# Patient Record
Sex: MALE | Race: ASIAN | URBAN - METROPOLITAN AREA
[De-identification: names, ages, dates, MRNs, and addresses within clinical notes are randomized per-mention and may not be internally consistent; named-entity substitution may affect disease eponyms.]

---

## 2023-09-01 ENCOUNTER — OFFICE VISIT (OUTPATIENT)
Dept: OTOLARYNGOLOGY | Facility: CLINIC | Age: 23
End: 2023-09-01

## 2023-09-01 DIAGNOSIS — H60.391 OTHER INFECTIVE CHRONIC OTITIS EXTERNA OF RIGHT EAR: Primary | ICD-10-CM

## 2023-09-01 PROCEDURE — 92504 EAR MICROSCOPY EXAMINATION: CPT | Performed by: OTOLARYNGOLOGY

## 2023-09-01 PROCEDURE — 99203 OFFICE O/P NEW LOW 30 MIN: CPT | Performed by: OTOLARYNGOLOGY

## 2023-09-01 RX ORDER — AMOXICILLIN 875 MG/1
TABLET, COATED ORAL
COMMUNITY
Start: 2023-08-29

## 2023-09-01 RX ORDER — NEOMYCIN SULFATE, POLYMYXIN B SULFATE AND HYDROCORTISONE 10; 3.5; 1 MG/ML; MG/ML; [USP'U]/ML
4 SUSPENSION/ DROPS AURICULAR (OTIC) 4 TIMES DAILY
Qty: 1 EACH | Refills: 0 | Status: SHIPPED | OUTPATIENT
Start: 2023-09-01

## 2023-09-01 RX ORDER — METHYLPREDNISOLONE 4 MG/1
TABLET ORAL
COMMUNITY
Start: 2023-08-29

## 2023-09-01 RX ORDER — CIPROFLOXACIN 500 MG/1
500 TABLET, FILM COATED ORAL EVERY 12 HOURS
Qty: 14 TABLET | Refills: 0 | Status: SHIPPED | OUTPATIENT
Start: 2023-09-01

## 2023-09-01 RX ORDER — METRONIDAZOLE 500 MG/1
TABLET ORAL
COMMUNITY
Start: 2023-06-15

## 2023-09-01 RX ORDER — CIPROFLOXACIN 500 MG/1
500 TABLET, FILM COATED ORAL 2 TIMES DAILY
COMMUNITY
Start: 2023-06-15

## 2023-09-05 ENCOUNTER — OFFICE VISIT (OUTPATIENT)
Dept: OTOLARYNGOLOGY | Facility: CLINIC | Age: 23
End: 2023-09-05
Payer: COMMERCIAL

## 2023-09-05 VITALS — TEMPERATURE: 98 F

## 2023-09-05 DIAGNOSIS — H60.391 OTHER INFECTIVE CHRONIC OTITIS EXTERNA OF RIGHT EAR: Primary | ICD-10-CM

## 2023-09-05 PROCEDURE — 99213 OFFICE O/P EST LOW 20 MIN: CPT | Performed by: OTOLARYNGOLOGY

## 2023-09-05 RX ORDER — NEOMYCIN SULFATE, POLYMYXIN B SULFATE AND HYDROCORTISONE 10; 3.5; 1 MG/ML; MG/ML; [USP'U]/ML
4 SUSPENSION/ DROPS AURICULAR (OTIC) 4 TIMES DAILY
Qty: 1 EACH | Refills: 0 | Status: SHIPPED | OUTPATIENT
Start: 2023-09-05

## 2023-09-12 ENCOUNTER — OFFICE VISIT (OUTPATIENT)
Dept: OTOLARYNGOLOGY | Facility: CLINIC | Age: 23
End: 2023-09-12

## 2023-09-12 VITALS — TEMPERATURE: 98 F

## 2023-09-12 DIAGNOSIS — H61.21 IMPACTED CERUMEN OF RIGHT EAR: ICD-10-CM

## 2023-09-12 DIAGNOSIS — H71.91 CHOLESTEATOMA OF RIGHT EAR: Primary | ICD-10-CM

## 2023-09-12 PROCEDURE — 99213 OFFICE O/P EST LOW 20 MIN: CPT | Performed by: OTOLARYNGOLOGY

## 2023-09-12 PROCEDURE — 69210 REMOVE IMPACTED EAR WAX UNI: CPT | Performed by: OTOLARYNGOLOGY

## 2023-09-13 ENCOUNTER — TELEPHONE (OUTPATIENT)
Dept: OTOLARYNGOLOGY | Facility: CLINIC | Age: 23
End: 2023-09-13

## 2023-09-13 DIAGNOSIS — H71.91 CHOLESTEATOMA OF RIGHT EAR: Primary | ICD-10-CM

## 2023-09-21 ENCOUNTER — TELEPHONE (OUTPATIENT)
Dept: OTOLARYNGOLOGY | Facility: CLINIC | Age: 23
End: 2023-09-21

## 2023-09-21 NOTE — TELEPHONE ENCOUNTER
Per Dr. Candace Padilla instructions patient to bring a CD scan to his visit. Patient scheduled an appointment to see Dr. Candace Padilla , will bring a disc.

## 2023-09-28 ENCOUNTER — OFFICE VISIT (OUTPATIENT)
Dept: OTOLARYNGOLOGY | Facility: CLINIC | Age: 23
End: 2023-09-28

## 2023-09-28 DIAGNOSIS — H71.91 CHOLESTEATOMA OF RIGHT EAR: Primary | ICD-10-CM

## 2023-09-28 PROCEDURE — 99213 OFFICE O/P EST LOW 20 MIN: CPT | Performed by: OTOLARYNGOLOGY

## 2023-09-28 RX ORDER — NEOMYCIN SULFATE, POLYMYXIN B SULFATE AND HYDROCORTISONE 10; 3.5; 1 MG/ML; MG/ML; [USP'U]/ML
4 SUSPENSION/ DROPS AURICULAR (OTIC) 4 TIMES DAILY
Qty: 1 EACH | Refills: 0 | Status: SHIPPED | OUTPATIENT
Start: 2023-09-28

## 2023-09-28 NOTE — PROGRESS NOTES
Boby Mohan is a 21year old male. Patient presents with: Follow - Up: F/up cholesteatoma of right ear  Pt had a CT but images on disc aren't accessible  Pt is calling to get images faxed [facility]        [facility]  He presents with 5-day history of right-sided ear pain. Prescribed amoxicillin in urgent care and the [facility] clinic. Seen twice and has not had any improvement in his symptoms. Very severe ear discomfort. 9/5/23 he has been using Cortisporin drops since I last saw him 4 days ago. Feels that things are slightly less painful and irritated. Had been on antibiotics in the past.  Noted to have severe wax impaction with associated otitis externa     9/12/23 has been using drops last 2 weeks. Now with some right ear pain. Last visit appeared to have cholesteatoma. 9/28/23 here to review his CT scan. Results as read by the radiologist revealed no abnormalities of the right or left temporal bone or associated ear structures. .  She noted to have significant amounts of cholesteatomatous looking material in the ear canal with what appeared to be some changes to the bony canal compared to the left side. Unfortunately we could not open up the images to a CT scan for my viewing. Feels that his hearing is much improved here for routine cleaning and reevaluation      Social History    Socioeconomic History      Marital status: Single      History reviewed. No pertinent family history. History reviewed. No pertinent past medical history. History reviewed. No pertinent surgical history. REVIEW OF SYSTEMS    System Neg/Pos Details   Constitutional Negative Fatigue, fever and weight loss. ENMT Negative Drooling. Eyes Negative Blurred vision and vision changes. Respiratory Negative Dyspnea and wheezing. Cardio Negative Chest pain, irregular heartbeat/palpitations and syncope. GI Negative Abdominal pain and diarrhea.    Endocrine Negative Cold intolerance and heat intolerance. Neuro Negative Tremors. Psych Negative Anxiety and depression. Integumentary Negative Frequent skin infections, pigment change and rash. Hema/Lymph Negative Easy bleeding and easy bruising. PHYSICAL EXAM    There were no vitals taken for this visit. Constitutional Normal Overall appearance - Normal.   Psychiatric Normal Orientation - Oriented to time, place, person & situation. Appropriate mood and affect. Neck Exam Normal Inspection - Normal. Palpation - Normal. Parotid gland - Normal. Thyroid gland - Normal.   Eyes Normal Conjunctiva - Right: Normal, Left: Normal. Pupil - Right: Normal, Left: Normal. Fundus - Right: Normal, Left: Normal.   Neurological Normal Memory - Normal. Cranial nerves - Cranial nerves II through XII grossly intact. Head/Face Normal Facial features - Normal. Eyebrows - Normal. Skull - Normal.        Nasopharynx Normal External nose - Normal. Lips/teeth/gums - Normal. Tonsils - Normal. Oropharynx - Normal.   Ears Normal Inspection - Right: Normal, Left: Normal. Canal - Right: Seems to have had some bony recession in certain areas. Left: Normal. TM - Right: Normal, Left: Normal.   Skin Normal Inspection - Normal.        Lymph Detail Normal Submental. Submandibular. Anterior cervical. Posterior cervical. Supraclavicular. Nose/Mouth/Throat Normal External nose - Normal. Lips/teeth/gums - Normal. Tonsils - Normal. Oropharynx - Normal.   Nose/Mouth/Throat Normal Nares - Right: Normal Left: Normal. Septum -Normal  Turbinates - Right: Normal, Left: Normal.       Current Outpatient Medications:     neomycin-polymyxin-hydrocortisone 3.5-84457-9 Otic Suspension, Place 4 drops into the right ear 4 (four) times daily. , Disp: 1 each, Rfl: 0    amoxicillin 875 MG Oral Tab, TAKE 1 TABLET BY MOUTH TWICE A DAY FOR 10 DAYS *NEED INSURANCE INFO*, Disp: , Rfl:     ciprofloxacin 500 MG Oral Tab, Take 1 tablet (500 mg total) by mouth 2 (two) times daily. , Disp: , Rfl:     methylPREDNISolone 4 MG Oral Tablet Therapy Pack, TAKE 6 TABLETS ON DAY 1 AS DIRECTED ON PACKAGE AND DECREASE BY 1 TAB EACH DAY FOR A TOTAL OF 6 DAYS (Patient not taking: Reported on 9/12/2023), Disp: , Rfl:     metRONIDAZOLE 500 MG Oral Tab, TAKE 1 TABLET BY MOUTH EVERY 8 HOURS FOR 5 DAYS, Disp: , Rfl:     ciprofloxacin 500 MG Oral Tab, Take 1 tablet (500 mg total) by mouth every 12 (twelve) hours. , Disp: 14 tablet, Rfl: 0  ASSESSMENT AND PLAN    1. Cholesteatoma of right ear  Some debris removed from the right ear canal.  Some changes to the canal bone but the eardrum is intact. Some areas of continued bleeding so I have asked him to continue with his eardrops for now. Return to see me in 2 weeks and he will try to get another copy of the CT scan so we can review the images. This note was prepared using [facility] [facility] [employer] voice recognition dictation software. As a result errors may occur. When identified these errors have been corrected. While every attempt is made to correct errors during dictation discrepancies may still exist    Cash Farr MD    9/28/2023    4:12 PM

## 2024-02-12 ENCOUNTER — TELEPHONE (OUTPATIENT)
Dept: OTOLARYNGOLOGY | Facility: CLINIC | Age: 24
End: 2024-02-12

## 2024-02-13 NOTE — TELEPHONE ENCOUNTER
Jessica was out of office, will be in tomorrow morning. Left her a message with Shelly to call us back regarding patient CT scan.

## 2024-04-12 ENCOUNTER — OFFICE VISIT (OUTPATIENT)
Dept: OTOLARYNGOLOGY | Facility: CLINIC | Age: 24
End: 2024-04-12

## 2024-04-12 VITALS
SYSTOLIC BLOOD PRESSURE: 110 MMHG | WEIGHT: 176.38 LBS | BODY MASS INDEX: 26.12 KG/M2 | DIASTOLIC BLOOD PRESSURE: 74 MMHG | HEART RATE: 62 BPM | HEIGHT: 69 IN

## 2024-04-12 DIAGNOSIS — H71.91 CHOLESTEATOMA OF RIGHT EAR: Primary | ICD-10-CM

## 2024-04-12 PROCEDURE — 99213 OFFICE O/P EST LOW 20 MIN: CPT | Performed by: OTOLARYNGOLOGY

## 2024-04-12 PROCEDURE — 69210 REMOVE IMPACTED EAR WAX UNI: CPT | Performed by: OTOLARYNGOLOGY

## 2024-04-12 NOTE — PROGRESS NOTES
Boby Espinoza is a 24 year old male.    Chief Complaint   Patient presents with    Ear Problem     Pt is here for cholesteatoma of right ear f/u, pt reports he feels something coming out of both ears. Pt did not bring disk from last visit.       HISTORY OF PRESENT ILLNESS  He presents with 5-day history of right-sided ear pain. Prescribed amoxicillin in urgent care and the [facility]. Seen twice and has not had any improvement in his symptoms. Very severe ear discomfort.      9/5/23 he has been using Cortisporin drops since I last saw him 4 days ago.  Feels that things are slightly less painful and irritated.  Had been on antibiotics in the past.  Noted to have severe wax impaction with associated otitis externa     9/12/23 has been using drops last 2 weeks.  Now with some right ear pain.  Last visit appeared to have cholesteatoma.      9/28/23 here to review his CT scan.  Results as read by the radiologist revealed no abnormalities of the right or left temporal bone or associated ear structures..  She noted to have significant amounts of cholesteatomatous looking material in the ear canal with what appeared to be some changes to the bony canal compared to the left side.  Unfortunately we could not open up the images to a CT scan for my viewing.  Feels that his hearing is much improved here for routine cleaning and reevaluation     4/12/24 I last saw him in September of last year and started him on some eardrops for of dehiscence of the medial canal adjacent to the tympanic membrane posteriorly.  He had some exposed bone and blood at that time.  No complaints of bleeding since then ear feels normal he does note some wax that comes out of it on occasion.      Social History     Socioeconomic History    Marital status: Single   Tobacco Use    Smoking status: Never    Smokeless tobacco: Never   Substance and Sexual Activity    Alcohol use: Never    Drug use: Never       History reviewed. No  pertinent family history.    History reviewed. No pertinent past medical history.    History reviewed. No pertinent surgical history.      REVIEW OF SYSTEMS    System Neg/Pos Details   Constitutional Negative Fatigue, fever and weight loss.   ENMT Negative Drooling.   Eyes Negative Blurred vision and vision changes.   Respiratory Negative Dyspnea and wheezing.   Cardio Negative Chest pain, irregular heartbeat/palpitations and syncope.   GI Negative Abdominal pain and diarrhea.   Endocrine Negative Cold intolerance and heat intolerance.   Neuro Negative Tremors.   Psych Negative Anxiety and depression.   Integumentary Negative Frequent skin infections, pigment change and rash.   Hema/Lymph Negative Easy bleeding and easy bruising.           PHYSICAL EXAM    /74 (BP Location: Right arm, Patient Position: Sitting, Cuff Size: large)   Pulse 62   Ht 5' 9\" (1.753 m)   Wt 176 lb 5.9 oz (80 kg)   BMI 26.05 kg/m²        Constitutional Normal Overall appearance - Normal.   Psychiatric Normal Orientation - Oriented to time, place, person & situation. Appropriate mood and affect.   Neck Exam Normal Inspection - Normal. Palpation - Normal. Parotid gland - Normal. Thyroid gland - Normal.   Eyes Normal Conjunctiva - Right: Normal, Left: Normal. Pupil - Right: Normal, Left: Normal. Fundus - Right: Normal, Left: Normal.   Neurological Normal Memory - Normal. Cranial nerves - Cranial nerves II through XII grossly intact.   Head/Face Normal Facial features - Normal. Eyebrows - Normal. Skull - Normal.        Nasopharynx Normal External nose - Normal. Lips/teeth/gums - Normal. Tonsils - Normal. Oropharynx - Normal.   Ears Normal Inspection - Right: Normal, Left: Normal. Canal - Right: Normal, Left: Normal. TM - Right: Normal, Left: Normal.  Depression of the mesial canal with a very thin layer of skin overlying it.  No obvious bony dehiscence present at this time.  Wax cleaned   Skin Normal Inspection - Normal.        Lymph  Detail Normal Submental. Submandibular. Anterior cervical. Posterior cervical. Supraclavicular.        Nose/Mouth/Throat Normal External nose - Normal. Lips/teeth/gums - Normal. Tonsils - Normal. Oropharynx - Normal.   Nose/Mouth/Throat Normal Nares - Right: Normal Left: Normal. Septum -Normal  Turbinates - Right: Normal, Left: Normal.   Microscopy  Binocular microscopy was performed. The affected ear(s) was/were examined and all debris removed using suction. The findings are described in the physical exam.   Right ear cleaned of all ceruminous debris using microscopy and curette and cupped forceps.  Slight depression at the previous erosion of the bone but now with a very thin layer of skin overlying it.  Normal exam on the left.    Current Outpatient Medications:     neomycin-polymyxin-hydrocortisone 3.5-20712-9 Otic Suspension, Place 4 drops into the right ear 4 (four) times daily. (Patient not taking: Reported on 4/12/2024), Disp: 1 each, Rfl: 0    amoxicillin 875 MG Oral Tab, TAKE 1 TABLET BY MOUTH TWICE A DAY FOR 10 DAYS *NEED INSURANCE INFO* (Patient not taking: Reported on 4/12/2024), Disp: , Rfl:     ciprofloxacin 500 MG Oral Tab, Take 1 tablet (500 mg total) by mouth 2 (two) times daily. (Patient not taking: Reported on 4/12/2024), Disp: , Rfl:     methylPREDNISolone 4 MG Oral Tablet Therapy Pack, TAKE 6 TABLETS ON DAY 1 AS DIRECTED ON PACKAGE AND DECREASE BY 1 TAB EACH DAY FOR A TOTAL OF 6 DAYS (Patient not taking: Reported on 9/12/2023), Disp: , Rfl:     metRONIDAZOLE 500 MG Oral Tab, TAKE 1 TABLET BY MOUTH EVERY 8 HOURS FOR 5 DAYS (Patient not taking: Reported on 4/12/2024), Disp: , Rfl:     ciprofloxacin 500 MG Oral Tab, Take 1 tablet (500 mg total) by mouth every 12 (twelve) hours. (Patient not taking: Reported on 4/12/2024), Disp: 14 tablet, Rfl: 0  ASSESSMENT AND PLAN    1. Cholesteatoma of right ear  Actually doing quite well.  Ear was cleaned of all ceruminous debris on the right.  Very minimal on  the left normal exam on the left on the right to the area of bony erosion seems to be covered with a very thin membrane of skin.  No bleeding since I last saw him.  I have asked him to follow strict water precautions and to return to see me in 6 months.  If he does better in 6 months and looks good we will make it a year.  He agrees with this plan  - REMOVAL IMPACTED CERUMEN REQUIRING INSTRUMENTATION, UNILATERAL        This note was prepared using [employer] voice recognition dictation software. As a result errors may occur. When identified these errors have been corrected. While every attempt is made to correct errors during dictation discrepancies may still exist    Cash Galeas MD    4/12/2024    8:28 AM